# Patient Record
Sex: FEMALE | Race: BLACK OR AFRICAN AMERICAN | NOT HISPANIC OR LATINO | Employment: UNEMPLOYED | URBAN - METROPOLITAN AREA
[De-identification: names, ages, dates, MRNs, and addresses within clinical notes are randomized per-mention and may not be internally consistent; named-entity substitution may affect disease eponyms.]

---

## 2023-03-31 LAB
ERYTHROCYTE DISTRIBUTION WIDTH (RATIO) BY AUTOMATED COUNT: 12.9 % (ref 11.5–14.5)
ERYTHROCYTE MEAN CORPUSCULAR HEMOGLOBIN CONCENTRATION (G/DL) BY AUTOMATED: 31.9 G/DL (ref 32–36)
ERYTHROCYTE MEAN CORPUSCULAR VOLUME (FL) BY AUTOMATED COUNT: 91 FL (ref 80–100)
ERYTHROCYTES (10*6/UL) IN BLOOD BY AUTOMATED COUNT: 3.91 X10E12/L (ref 4–5.2)
GLUCOSE, 1 HR SCREEN, PREG: 97 MG/DL
HEMATOCRIT (%) IN BLOOD BY AUTOMATED COUNT: 35.4 % (ref 36–46)
HEMOGLOBIN (G/DL) IN BLOOD: 11.3 G/DL (ref 12–16)
LEUKOCYTES (10*3/UL) IN BLOOD BY AUTOMATED COUNT: 14.3 X10E9/L (ref 4.4–11.3)
NRBC (PER 100 WBCS) BY AUTOMATED COUNT: 0 /100 WBC (ref 0–0)
PLATELETS (10*3/UL) IN BLOOD AUTOMATED COUNT: 260 X10E9/L (ref 150–450)
REFLEX ADDED, ANEMIA PANEL: ABNORMAL
SYPHILIS TOTAL AB: NONREACTIVE

## 2023-05-18 ENCOUNTER — HOSPITAL ENCOUNTER (OUTPATIENT)
Dept: DATA CONVERSION | Facility: HOSPITAL | Age: 33
End: 2023-05-18
Attending: OBSTETRICS & GYNECOLOGY

## 2023-05-18 DIAGNOSIS — Z3A.33 33 WEEKS GESTATION OF PREGNANCY (HHS-HCC): ICD-10-CM

## 2023-05-18 DIAGNOSIS — O26.893 OTHER SPECIFIED PREGNANCY RELATED CONDITIONS, THIRD TRIMESTER (HHS-HCC): ICD-10-CM

## 2023-05-18 DIAGNOSIS — O99.333 SMOKING (TOBACCO) COMPLICATING PREGNANCY, THIRD TRIMESTER (HHS-HCC): ICD-10-CM

## 2023-05-18 DIAGNOSIS — R10.13 EPIGASTRIC PAIN: ICD-10-CM

## 2023-05-18 DIAGNOSIS — O09.213 SUPERVISION OF PREGNANCY WITH HISTORY OF PRE-TERM LABOR, THIRD TRIMESTER (HHS-HCC): ICD-10-CM

## 2023-05-18 DIAGNOSIS — O98.513 OTHER VIRAL DISEASES COMPLICATING PREGNANCY, THIRD TRIMESTER (HHS-HCC): ICD-10-CM

## 2023-05-18 DIAGNOSIS — F17.210 NICOTINE DEPENDENCE, CIGARETTES, UNCOMPLICATED: ICD-10-CM

## 2023-05-18 DIAGNOSIS — B00.9 HERPESVIRAL INFECTION, UNSPECIFIED: ICD-10-CM

## 2023-05-18 DIAGNOSIS — D64.9 ANEMIA, UNSPECIFIED: ICD-10-CM

## 2023-05-18 DIAGNOSIS — O99.013 ANEMIA COMPLICATING PREGNANCY, THIRD TRIMESTER (HHS-HCC): ICD-10-CM

## 2023-05-18 LAB
ALANINE AMINOTRANSFERASE (SGPT) (U/L) IN SER/PLAS: 16 U/L (ref 7–45)
ALBUMIN (G/DL) IN SER/PLAS: 3.5 G/DL (ref 3.4–5)
ALKALINE PHOSPHATASE (U/L) IN SER/PLAS: 70 U/L (ref 33–110)
ANION GAP IN SER/PLAS: 14 MMOL/L (ref 10–20)
ASPARTATE AMINOTRANSFERASE (SGOT) (U/L) IN SER/PLAS: 21 U/L (ref 9–39)
BILIRUBIN TOTAL (MG/DL) IN SER/PLAS: 0.3 MG/DL (ref 0–1.2)
CALCIUM (MG/DL) IN SER/PLAS: 8.5 MG/DL (ref 8.6–10.6)
CARBON DIOXIDE, TOTAL (MMOL/L) IN SER/PLAS: 22 MMOL/L (ref 21–32)
CHLORIDE (MMOL/L) IN SER/PLAS: 108 MMOL/L (ref 98–107)
CREATININE (MG/DL) IN SER/PLAS: 0.62 MG/DL (ref 0.5–1.05)
ERYTHROCYTE DISTRIBUTION WIDTH (RATIO) BY AUTOMATED COUNT: 13.5 % (ref 11.5–14.5)
ERYTHROCYTE MEAN CORPUSCULAR HEMOGLOBIN CONCENTRATION (G/DL) BY AUTOMATED: 32.1 G/DL (ref 32–36)
ERYTHROCYTE MEAN CORPUSCULAR VOLUME (FL) BY AUTOMATED COUNT: 89 FL (ref 80–100)
ERYTHROCYTES (10*6/UL) IN BLOOD BY AUTOMATED COUNT: 3.69 X10E12/L (ref 4–5.2)
GFR FEMALE: >90 ML/MIN/1.73M2
GLUCOSE (MG/DL) IN SER/PLAS: 97 MG/DL (ref 74–99)
HEMATOCRIT (%) IN BLOOD BY AUTOMATED COUNT: 33 % (ref 36–46)
HEMOGLOBIN (G/DL) IN BLOOD: 10.6 G/DL (ref 12–16)
LEUKOCYTES (10*3/UL) IN BLOOD BY AUTOMATED COUNT: 14 X10E9/L (ref 4.4–11.3)
NRBC (PER 100 WBCS) BY AUTOMATED COUNT: 0 /100 WBC (ref 0–0)
PLATELETS (10*3/UL) IN BLOOD AUTOMATED COUNT: 242 X10E9/L (ref 150–450)
POTASSIUM (MMOL/L) IN SER/PLAS: 3.4 MMOL/L (ref 3.5–5.3)
PROTEIN TOTAL: 5.9 G/DL (ref 6.4–8.2)
SODIUM (MMOL/L) IN SER/PLAS: 141 MMOL/L (ref 136–145)
UREA NITROGEN (MG/DL) IN SER/PLAS: 9 MG/DL (ref 6–23)

## 2023-05-21 LAB — GROUP B STREP SCREEN: NORMAL

## 2023-06-04 ENCOUNTER — HOSPITAL ENCOUNTER (OUTPATIENT)
Dept: DATA CONVERSION | Facility: HOSPITAL | Age: 33
End: 2023-06-04
Attending: OBSTETRICS & GYNECOLOGY

## 2023-06-04 DIAGNOSIS — O09.213 SUPERVISION OF PREGNANCY WITH HISTORY OF PRE-TERM LABOR, THIRD TRIMESTER (HHS-HCC): ICD-10-CM

## 2023-06-04 DIAGNOSIS — O99.333 SMOKING (TOBACCO) COMPLICATING PREGNANCY, THIRD TRIMESTER (HHS-HCC): ICD-10-CM

## 2023-06-04 DIAGNOSIS — B00.9 HERPESVIRAL INFECTION, UNSPECIFIED: ICD-10-CM

## 2023-06-04 DIAGNOSIS — Z79.899 OTHER LONG TERM (CURRENT) DRUG THERAPY: ICD-10-CM

## 2023-06-04 DIAGNOSIS — Z3A.36 36 WEEKS GESTATION OF PREGNANCY (HHS-HCC): ICD-10-CM

## 2023-06-04 DIAGNOSIS — F17.210 NICOTINE DEPENDENCE, CIGARETTES, UNCOMPLICATED: ICD-10-CM

## 2023-06-04 DIAGNOSIS — O98.513 OTHER VIRAL DISEASES COMPLICATING PREGNANCY, THIRD TRIMESTER (HHS-HCC): ICD-10-CM

## 2023-06-22 LAB — GROUP B STREP SCREEN: NORMAL

## 2023-06-25 ENCOUNTER — HOSPITAL ENCOUNTER (OUTPATIENT)
Dept: DATA CONVERSION | Facility: HOSPITAL | Age: 33
End: 2023-06-25
Attending: OBSTETRICS & GYNECOLOGY

## 2023-07-27 LAB
CHLAMYDIA TRACH., AMPLIFIED: NEGATIVE
N. GONORRHEA, AMPLIFIED: NEGATIVE
TRICHOMONAS VAGINALIS: NEGATIVE

## 2023-09-03 PROBLEM — Z34.90 PRENATAL CARE (HHS-HCC): Status: ACTIVE | Noted: 2023-09-03

## 2023-09-03 PROBLEM — A60.04 HERPES SIMPLEX VULVOVAGINITIS: Status: ACTIVE | Noted: 2023-09-03

## 2023-09-03 PROBLEM — R87.610 ATYPICAL SQUAMOUS CELLS OF UNDETERMINED SIGNIFICANCE (ASC-US) ON CERVICAL PAP SMEAR: Status: ACTIVE | Noted: 2023-09-03

## 2023-09-03 RX ORDER — MULTIVIT,IRON,MINERALS/LUTEIN
1 TABLET ORAL DAILY
COMMUNITY
Start: 2022-12-05

## 2023-09-03 RX ORDER — VALACYCLOVIR HYDROCHLORIDE 500 MG/1
500 TABLET, FILM COATED ORAL 2 TIMES DAILY
COMMUNITY
Start: 2023-06-14

## 2023-09-07 VITALS
OXYGEN SATURATION: 95 % | WEIGHT: 194.45 LBS | HEIGHT: 66 IN | BODY MASS INDEX: 31.25 KG/M2 | RESPIRATION RATE: 16 BRPM | DIASTOLIC BLOOD PRESSURE: 65 MMHG | SYSTOLIC BLOOD PRESSURE: 118 MMHG | HEART RATE: 109 BPM | TEMPERATURE: 98.2 F

## 2023-09-07 VITALS — WEIGHT: 191.8 LBS | BODY MASS INDEX: 30.82 KG/M2 | HEIGHT: 66 IN

## 2023-09-30 NOTE — PROGRESS NOTES
Current Stage:   Stage: Triage     Subjective Data:   Antepartum:  Vaginal Bleeding: No   Contractions/Abdominal Pain: Yes   Discharge/Loss of Fluid: No   Fetal Movement: Good   Fevers/Chills: No   Preeclampsia Symptoms: No   Antepartum:    32yo  at 36.0wga by LMP c/w 11wk US presents for ctx. Q6-7min since this AM, but irregular. No VB, LOF. Good FM. No other concerns    Pregnancy n/f  - Hx PTL & PTD at 29wga. LTCS for breech  - Hx  x1, desires TOLAC. MFMU score 92.6%  - Hx shoulder dystocia in P1 with 10lb infant, subsequent 2 vaginal deliveries without shoulder  - FGR, last US  EFW 2178g 7%, AC 6%  - HSV, on valtrex  - Desires BTL, consent signed     ObHx:     at term, shoulder dystocia (10lb)    at term   pLTCS for PTL and breech at 29 weeks    at term  GynHx:  pap ASCUS HPV+ s/p normal colpo  PMHx: as above  SurgHx: CS as above  Meds: PNV  All: NKDA   FHx: reviewed and noncontributory to presenting complaint   SocHx: 1-2 cigarettes/day, denies alcohol use during pregnancy, denies recreational drug use      Objective Information:    Objective Information:      T   P  R  BP   MAP  SpO2   Value  36.6  102  18  130/70   93  97%  Date/Time  14:35  14:43  14:35  14:35   14:35  14:43  Range  (36.6C - 36.6C )  (102 - 104 )  (18 - 18 )  (130 - 130 )/ (70 - 70 )  (93 - 93 )  (97% - 100% )      Pain reported at  14:35: 0 = None      Physical Exam:   Constitutional: alert and oriented, no acute distress   Obstetric: FHT: 130/mod derrick/+accel/-decel  TOCO: quiet  SVE: 2/30/-3   Eyes: EOMI   ENMT: MMM   Head/Neck: NCAT   Respiratory/Thorax: good respiratory effort   Cardiovascular: warm, well perfused   Gastrointestinal: abdomen gravid, soft, nontender   Musculoskeletal: no deformities   Extremities: moving normally, no edema appreciated   Neurological: no gross deficits   Psychological: appropriate mood and affect   Skin: warm, dry      Assessment and Plan:   Assessment:    32yo  at 36.0wga by LMP c/w 11wk US presents for ctx    Ctx  - Exam consistent with prior exam, low concern for PTL  - Abd exam benign, low concern for uterine rupture or other acute process  - Discussed 2hr recheck vs discharge, pt lives close by and desires discharge. Return precautions given    IUP  - Up to date on care, follows with Dr. Mccullough  - NST reactive, reassuring    D/w Dr. Marilu Baer MD  PGY-2, Obstetrics and Gynecology    Attestation:   Note Completion:  I am a:  Resident/Fellow   Attending Attestation I saw and evaluated the patient.  I personally obtained the key and critical portions of the history and physical exam or was physically present for key and  critical portions performed by the resident/fellow. I reviewed the resident/fellow?s documentation and discussed the patient with the resident/fellow.  I agree with the resident/fellow?s medical decision making as documented in the note.     I personally evaluated the patient on 2023         Electronic Signatures:  Marilee Baer (Resident))  (Signed 2023 15:56)   Authored: Current Stage, Subjective Data, Objective Data,  Assessment and Plan, Note Completion  Aristides Michel)  (Signed 2023 19:32)   Authored: Note Completion   Co-Signer: Subjective Data, Objective Data, Assessment and Plan, Note Completion      Last Updated: 2023 19:32 by Aristides Michel)

## 2023-09-30 NOTE — PROGRESS NOTES
Current Stage:   Stage: Triage     Subjective Data:   Antepartum:  Vaginal Bleeding: No   Contractions/Abdominal Pain: No   Discharge/Loss of Fluid: No   Fetal Movement: Good   Fevers/Chills: No   Preeclampsia Symptoms: No   Antepartum:    32 y/o  at 33.4 wga by LMP c/w 11wk US presents with sharp 8/10 abdominal pain in the epigastric region. Patient reports having similar pain in the past but  it states that it has worsened recently. Pain is consistently worse after eating and not affected by position.  Denies fever, constipation or GI symptoms. Endorses regular fetal movement, denies vaginal bleeding or discharge, denies ROM, denies contractions.    Obhx:   2008: vaginal delivery at term, shoulder dystocia (10lb)  2010:  at term  2011: LTCS PTL and breech 29 weeks  2014:  at term    Cervical length screening normal, prenatal labs up to date    Most recent Hgb: 10.6 23    PMHx: HSV  Meds: PNV  FHx: diabetes, hypertension  SocHx: 1-2 cigarettes/day, denies alcohol use during pregnancy, denies recreational drug use  All: NKDA      Objective Information:    Objective Information:      T   P  R  BP   MAP  SpO2   Value  36.3  47  18  127/69   90  81%  Date/Time  2:38  3:22  2:38  2:38   2:38  3:22  Range  (36.3C - 36.8C )  (47 - 110 )  (16 - 18 )  (118 - 127 )/ (65 - 69 )  (90 - 90 )  (81% - 97% )      Pain reported at  2:38: 8 = Severe      Physical Exam:   Constitutional: Well developed, no acute distress   Obstetric: , + accels, no decels, moderate  variability  SVE 2/long/hi  [place] none   Eyes: EOMI, sclera clear   ENMT: MMM, no apparent injury or lesions   Head/Neck: NCAT   Respiratory/Thorax: Normal respiratory effort on  RA   Cardiovascular: regular rate   Gastrointestinal: abdomen gravid, mild tenderness  in epigastric region   Genitourinary: normal external anatomy, no lesions  or rashes   Musculoskeletal: ROM grossly normal   Extremities: No  edema, erythema, cyanosis   Neurological: no focal deficits   Breast: deferred   Lymphatic: deferred   Psychological: Appropriate mood and behavior   Skin: WDWP     Recent Lab Results:    Results:    CBC: 2023 03:20              \     Hgb     /                              \     10.6 L    /  WBC  ----------------  Plt               14.0 H    ----------------    242              /     Hct     \                              /     33.0 L    \            RBC: 3.69 L    MCV: 89               I have reviewed these laboratory results: Complete Blood Count [Drawn 18-May-2023 03:20:00], Comprehensive Metabolic Panel [Drawn 18-May-2023 03:20:00].      I have reviewed these laboratory results:    Comprehensive Metabolic Panel  18-May-2023 03:20:00      Result Value    Glucose, Serum  97    NA  141    K  3.4   L   CL  108   H   Bicarbonate, Serum  22    Anion Gap, Serum  14    BUN  9    CREAT  0.62    GFR Female  >90    Calcium, Serum  8.5   L   ALB  3.5    ALKP  70    T Pro  5.9   L   T Bili  0.3    Alanine Aminotransferase, Serum  16    Aspartate Transaminase, Serum  21         Testing:   NST Interpretation - Baby A:  ·  Baseline    ·  Variability moderate (amplitude range 6 to 25 bpm)   ·  Interpretation Reactive (2 15x15 accels)   ·  Accelerations +   ·  Decelerations -     Assessment and Plan:   Assessment:    34 y/o  at 33.4 wga by LMP c/w 11wk US with epigastric pain, worsens with eating, concerning for GERD    1) Epigastric abdominal pain  - Likely GERD given postprandial exacerbation of symptoms and improvement with famotidine  - Mild anemia on CBC, CMP otherwise unremarkable, except for mildly low K+ at 3.4  - Rx sent for famotidine 40mg daily, also recommend tums OTC    2) Routine Prenatal care  - IUP @ 33.4wga, NST reactive, reassuring  - Routine prenatal follow up with Dr. Mccullough  at 1530  - patient had a prior successful  and would like another one  - hx of HSV, planning for  suppression at 36 weeks  - hx of  labor at 29 weeks, cervical length screening normal  - prenatal labs reviewed, up to date  - recommend PO iron daily for mild anemia at 10.6    Reviewed labor precautions with patient. Discussed need to return to labor and delivery if patient has strong, regular contractions, leakage of fluid, vaginal bleeding, or decreased fetal movement. Patient expresses understanding. Safe and stable for  discharge at this time.    Pt seen and discussed w/ Dr. Michaels and Dr. Yennifer Stewart (MS3)    PGY4 Addendum: saw patient and discussed with student, agree with plan and assessment as edited and documented above.    Ramo De Oliveira MD PGY-4  OBGYN Chief [id number]  Vocera/Brieo                Attestation:   Note Completion:  I am a:  Resident/Fellow   Attending Attestation I saw and evaluated the patient.  I personally obtained the key and critical portions of the history and physical exam or was physically present for key and  critical portions performed by the resident/fellow. I reviewed the resident/fellow?s documentation and discussed the patient with the resident/fellow.  I agree with the resident/fellow?s medical decision making as documented in the note.     I personally evaluated the patient on 18-May-2023         Electronic Signatures:  Trenton Michaels)  (Signed 19-May-2023 10:48)   Authored: Note Completion   Co-Signer: Subjective Data, Objective Data, Assessment and Plan, Note Completion  Ramo De Oliveira (Resident))  (Signed 18-May-2023 08:43)   Authored: Subjective Data, Objective Data,   Testing, Assessment and Plan, Note Completion  Ramesh Stewart (MED STUD)  (Signed 18-May-2023 04:59)   Authored: Current Stage, Subjective Data, Objective Data,  Assessment and Plan      Last Updated: 19-May-2023 10:48 by Trenton Michaels)

## 2023-10-09 ENCOUNTER — OFFICE VISIT (OUTPATIENT)
Dept: OBSTETRICS AND GYNECOLOGY | Facility: CLINIC | Age: 33
End: 2023-10-09
Payer: COMMERCIAL

## 2023-10-09 DIAGNOSIS — Z30.8 ENCOUNTER FOR OTHER CONTRACEPTIVE MANAGEMENT: ICD-10-CM

## 2023-10-09 PROCEDURE — 96372 THER/PROPH/DIAG INJ SC/IM: CPT | Performed by: ADVANCED PRACTICE MIDWIFE

## 2023-10-09 RX ORDER — MEDROXYPROGESTERONE ACETATE 150 MG/ML
150 INJECTION, SUSPENSION INTRAMUSCULAR ONCE
Status: CANCELLED | OUTPATIENT
Start: 2023-10-09 | End: 2023-10-09

## 2023-10-09 RX ORDER — MEDROXYPROGESTERONE ACETATE 150 MG/ML
150 INJECTION, SUSPENSION INTRAMUSCULAR
Status: SHIPPED | OUTPATIENT
Start: 2023-10-09

## 2023-10-09 RX ADMIN — MEDROXYPROGESTERONE ACETATE 150 MG: 150 INJECTION, SUSPENSION INTRAMUSCULAR at 10:55

## 2023-10-09 NOTE — PROGRESS NOTES
Patient here for depo provera injection    Last injection:  7/25/23  Next injection due:  12/25-1/8    Patient doing well on the medication with no complaints  Annual exam is due:  10/2024    ABHI Tarango RN

## 2023-10-30 ENCOUNTER — TELEPHONE (OUTPATIENT)
Dept: OBSTETRICS AND GYNECOLOGY | Facility: CLINIC | Age: 33
End: 2023-10-30

## 2023-11-01 ENCOUNTER — TELEPHONE (OUTPATIENT)
Dept: OBSTETRICS AND GYNECOLOGY | Facility: CLINIC | Age: 33
End: 2023-11-01
Payer: COMMERCIAL

## 2023-11-14 NOTE — TELEPHONE ENCOUNTER
Left another message today, we could change her depo appointment on 12/26 to include colposcopy.

## 2023-12-26 ENCOUNTER — APPOINTMENT (OUTPATIENT)
Dept: OBSTETRICS AND GYNECOLOGY | Facility: CLINIC | Age: 33
End: 2023-12-26
Payer: COMMERCIAL

## 2024-05-17 ENCOUNTER — TELEPHONE (OUTPATIENT)
Dept: OBSTETRICS AND GYNECOLOGY | Facility: CLINIC | Age: 34
End: 2024-05-17

## 2024-05-17 ENCOUNTER — APPOINTMENT (OUTPATIENT)
Dept: OBSTETRICS AND GYNECOLOGY | Facility: CLINIC | Age: 34
End: 2024-05-17
Payer: COMMERCIAL

## 2024-05-17 NOTE — TELEPHONE ENCOUNTER
Called patient to schedule her for a depo injection. Patient scheduled for Monday 5/20/2024. Patient has no questions or concerns

## 2024-05-20 ENCOUNTER — APPOINTMENT (OUTPATIENT)
Dept: OBSTETRICS AND GYNECOLOGY | Facility: CLINIC | Age: 34
End: 2024-05-20
Payer: COMMERCIAL

## 2024-05-24 ENCOUNTER — CLINICAL SUPPORT (OUTPATIENT)
Dept: OBSTETRICS AND GYNECOLOGY | Facility: CLINIC | Age: 34
End: 2024-05-24
Payer: COMMERCIAL

## 2024-05-24 DIAGNOSIS — Z30.42 DEPO-PROVERA CONTRACEPTIVE STATUS: ICD-10-CM

## 2024-05-24 LAB — PREGNANCY TEST URINE, POC: NEGATIVE

## 2024-05-24 PROCEDURE — 96372 THER/PROPH/DIAG INJ SC/IM: CPT | Performed by: ADVANCED PRACTICE MIDWIFE

## 2024-05-24 PROCEDURE — 81025 URINE PREGNANCY TEST: CPT | Performed by: OBSTETRICS & GYNECOLOGY

## 2024-05-24 RX ADMIN — MEDROXYPROGESTERONE ACETATE 150 MG: 150 INJECTION, SUSPENSION INTRAMUSCULAR at 16:00

## 2024-05-24 NOTE — PROGRESS NOTES
Patient here for Depo injection.  UPT: negative in office   Last Annual: 7/25/23  Complaints with being on Depo Provera: None  RN discussed Depo-Provera side effects.  Depo given IM right deltoid region. Depo given from office stock Patient tolerated well.  Patient to RTC between August 9th -23rd  for depo and annual is due  Patient verbalized understanding and all questions were answered.

## 2024-08-09 ENCOUNTER — APPOINTMENT (OUTPATIENT)
Dept: OBSTETRICS AND GYNECOLOGY | Facility: CLINIC | Age: 34
End: 2024-08-09
Payer: COMMERCIAL

## 2024-08-16 ENCOUNTER — APPOINTMENT (OUTPATIENT)
Dept: OBSTETRICS AND GYNECOLOGY | Facility: CLINIC | Age: 34
End: 2024-08-16
Payer: COMMERCIAL

## 2024-09-04 ENCOUNTER — APPOINTMENT (OUTPATIENT)
Dept: OBSTETRICS AND GYNECOLOGY | Facility: CLINIC | Age: 34
End: 2024-09-04
Payer: COMMERCIAL

## 2024-09-04 VITALS
DIASTOLIC BLOOD PRESSURE: 77 MMHG | HEIGHT: 66 IN | BODY MASS INDEX: 30.37 KG/M2 | WEIGHT: 189 LBS | SYSTOLIC BLOOD PRESSURE: 135 MMHG

## 2024-09-04 DIAGNOSIS — Z30.42 DEPO-PROVERA CONTRACEPTIVE STATUS: Primary | ICD-10-CM

## 2024-09-04 LAB — PREGNANCY TEST URINE, POC: NEGATIVE

## 2024-09-04 PROCEDURE — 1036F TOBACCO NON-USER: CPT | Performed by: OBSTETRICS & GYNECOLOGY

## 2024-09-04 PROCEDURE — 3008F BODY MASS INDEX DOCD: CPT | Performed by: OBSTETRICS & GYNECOLOGY

## 2024-09-04 PROCEDURE — 99213 OFFICE O/P EST LOW 20 MIN: CPT | Performed by: OBSTETRICS & GYNECOLOGY

## 2024-09-04 PROCEDURE — 96372 THER/PROPH/DIAG INJ SC/IM: CPT | Performed by: OBSTETRICS & GYNECOLOGY

## 2024-09-04 PROCEDURE — 81025 URINE PREGNANCY TEST: CPT | Performed by: OBSTETRICS & GYNECOLOGY

## 2024-09-04 RX ORDER — MEDROXYPROGESTERONE ACETATE 150 MG/ML
150 INJECTION, SUSPENSION INTRAMUSCULAR ONCE
Status: COMPLETED | OUTPATIENT
Start: 2024-09-04 | End: 2024-09-04

## 2024-09-04 ASSESSMENT — PAIN SCALES - GENERAL: PAINLEVEL: 0-NO PAIN

## 2024-09-04 ASSESSMENT — ENCOUNTER SYMPTOMS
OCCASIONAL FEELINGS OF UNSTEADINESS: 0
DEPRESSION: 0
LOSS OF SENSATION IN FEET: 0

## 2024-09-04 NOTE — PROGRESS NOTES
SUBJECTIVE    33 y.o.  Having periods female presents for   Chief Complaint   Patient presents with    Contraception     Pt here to restart depo injections  Denies concerns      Pt presents for Depo Provera restart.  Her last injection was 2024 and she missed a few appts.  She has been sexually active without other forms of birth control.    OB/GYN History  No LMP recorded (lmp unknown).    Social History     Substance and Sexual Activity   Sexual Activity Yes    Partners: Male, Choose not to disclose    Birth control/protection: Injection       OB History    Para Term  AB Living   2 2 2     1   SAB IAB Ectopic Multiple Live Births           1      # Outcome Date GA Lbr Yonas/2nd Weight Sex Type Anes PTL Lv   2 Term 08 39w0d  4.536 kg F Vag-Spont   ELLYN      Birth Comments: shoulder dystocia   1 Term    3.515 kg           Birth Comments: No shoulder dystocia       Past Medical History  She has a past medical history of Benign intracranial hypertension.    Surgical History  She has a past surgical history that includes Other surgical history (2022) and  section, low transverse (2013).     Social History  She reports that she has quit smoking. She has never used smokeless tobacco. She reports that she does not currently use alcohol. She reports that she does not currently use drugs.    Screenings  Social Determinants of Health     Tobacco Use: Medium Risk (2024)    Patient History     Smoking Tobacco Use: Former     Smokeless Tobacco Use: Never     Passive Exposure: Not on file   Alcohol Use: Alcohol Misuse (2020)    Received from [facility], [facility]    AUDIT-C     Frequency of Alcohol Consumption: 2-3 times a week     Average Number of Drinks: 3 or 4     Frequency of Binge Drinking: Never   Financial Resource Strain: Not on file   Food Insecurity: Not on file   Transportation Needs: Not on file   Physical Activity: Not on file   Stress: Not on  "file   Social Connections: Not on file   Intimate Partner Violence: Not on file   Depression: Not on file   Housing Stability: Not on file   Utilities: Not on file   Digital Equity: Not on file   Health Literacy: Not on file         OBJECTIVE  Vitals:    09/04/24 1511   BP: 135/77   Weight: 85.7 kg (189 lb)   Height: 1.676 m (5' 6\")     Body mass index is 30.51 kg/m².     Chaperone: Present: yes  OBGyn Exam deferred    UPT negative    ASSESSMENT & PLAN  Problem List Items Addressed This Visit    None  Visit Diagnoses       Depo-Provera contraceptive status    -  Primary            Follow up: DepoProvera administered-- we did discuss that there is always a possibility of a very early pregnancy that a UPT would miss given her recent sexual activity, although the likelihood is extremely low. Pt desires to receive DepoProvera, which is a reasonable decision. RTO 12 weeks.    Bethel Hays MD  Obstetrics & Gynecology  09/04/24  "

## 2024-11-20 ENCOUNTER — APPOINTMENT (OUTPATIENT)
Dept: OBSTETRICS AND GYNECOLOGY | Facility: CLINIC | Age: 34
End: 2024-11-20
Payer: COMMERCIAL

## 2024-11-20 VITALS — WEIGHT: 183 LBS | SYSTOLIC BLOOD PRESSURE: 140 MMHG | DIASTOLIC BLOOD PRESSURE: 82 MMHG | BODY MASS INDEX: 29.54 KG/M2

## 2024-11-20 DIAGNOSIS — Z30.42 DEPO-PROVERA CONTRACEPTIVE STATUS: ICD-10-CM

## 2024-11-20 PROCEDURE — 96372 THER/PROPH/DIAG INJ SC/IM: CPT | Performed by: ADVANCED PRACTICE MIDWIFE

## 2024-11-20 ASSESSMENT — ENCOUNTER SYMPTOMS
HEMATOLOGIC/LYMPHATIC NEGATIVE: 0
NEUROLOGICAL NEGATIVE: 0
ENDOCRINE NEGATIVE: 0
GASTROINTESTINAL NEGATIVE: 0
MUSCULOSKELETAL NEGATIVE: 0
CARDIOVASCULAR NEGATIVE: 0
PSYCHIATRIC NEGATIVE: 0
RESPIRATORY NEGATIVE: 0
EYES NEGATIVE: 0
CONSTITUTIONAL NEGATIVE: 0
ALLERGIC/IMMUNOLOGIC NEGATIVE: 0

## 2024-11-20 NOTE — PROGRESS NOTES
Patient identified by name and      Patient received her last injection on 2024     Patient educated on the importance of compliance   Patient given injection in patient tolerated well   Patient encouraged to schedule return visit prior to leaving the office on or between 2025 - 2025      Patient encouraged to call office if she has any future questions or concerns     Patient verbalized understanding.     Patients next dose to be given 2025 - 2025

## 2025-02-03 ENCOUNTER — APPOINTMENT (OUTPATIENT)
Dept: OBSTETRICS AND GYNECOLOGY | Facility: CLINIC | Age: 35
End: 2025-02-03
Payer: COMMERCIAL

## 2025-02-05 ENCOUNTER — APPOINTMENT (OUTPATIENT)
Dept: OBSTETRICS AND GYNECOLOGY | Facility: CLINIC | Age: 35
End: 2025-02-05
Payer: COMMERCIAL

## 2025-02-06 ENCOUNTER — PROCEDURE VISIT (OUTPATIENT)
Dept: OBSTETRICS AND GYNECOLOGY | Facility: CLINIC | Age: 35
End: 2025-02-06
Payer: COMMERCIAL

## 2025-02-06 VITALS
WEIGHT: 176 LBS | BODY MASS INDEX: 27.62 KG/M2 | DIASTOLIC BLOOD PRESSURE: 71 MMHG | SYSTOLIC BLOOD PRESSURE: 135 MMHG | HEIGHT: 67 IN

## 2025-02-06 DIAGNOSIS — Z30.42 SURVEILLANCE FOR DEPO-PROVERA CONTRACEPTION: Primary | ICD-10-CM

## 2025-02-06 RX ADMIN — MEDROXYPROGESTERONE ACETATE 150 MG: 150 INJECTION, SUSPENSION INTRAMUSCULAR at 11:00

## 2025-02-06 NOTE — PROGRESS NOTES
Subjective   Patient ID: Amalia Grace is a 34 y.o. female who presents for Injections (Pt is here for her Depo Provera that was given by the office./No pain/No falls//).   No complaints. Happy with DMPA        Review of Systems   All other systems reviewed and are negative.      Objective   Physical Exam  Vitals reviewed.   Constitutional:       General: She is not in acute distress.     Appearance: She is not ill-appearing.   Pulmonary:      Effort: Pulmonary effort is normal.   Skin:     Coloration: Skin is not pale.   Neurological:      Mental Status: She is alert.         Assessment/Plan   Diagnoses and all orders for this visit:  Surveillance for Depo-Provera contraception    Here for DMPA injection. Happy with method. No complaints. FU in 3 months.       Dani Hopkins MD 02/06/25 3:44 PM

## 2025-04-25 ENCOUNTER — APPOINTMENT (OUTPATIENT)
Dept: OBSTETRICS AND GYNECOLOGY | Facility: CLINIC | Age: 35
End: 2025-04-25
Payer: COMMERCIAL

## 2025-05-05 ENCOUNTER — APPOINTMENT (OUTPATIENT)
Dept: OBSTETRICS AND GYNECOLOGY | Facility: CLINIC | Age: 35
End: 2025-05-05
Payer: COMMERCIAL

## 2025-05-05 VITALS
HEIGHT: 66 IN | DIASTOLIC BLOOD PRESSURE: 76 MMHG | WEIGHT: 185 LBS | BODY MASS INDEX: 29.73 KG/M2 | SYSTOLIC BLOOD PRESSURE: 120 MMHG

## 2025-05-05 DIAGNOSIS — Z30.42 SURVEILLANCE FOR DEPO-PROVERA CONTRACEPTION: ICD-10-CM

## 2025-05-05 PROCEDURE — 96372 THER/PROPH/DIAG INJ SC/IM: CPT | Performed by: OBSTETRICS & GYNECOLOGY

## 2025-05-05 RX ADMIN — MEDROXYPROGESTERONE ACETATE 150 MG: 150 INJECTION, SUSPENSION INTRAMUSCULAR at 08:30

## 2025-07-22 ENCOUNTER — APPOINTMENT (OUTPATIENT)
Dept: OBSTETRICS AND GYNECOLOGY | Facility: CLINIC | Age: 35
End: 2025-07-22
Payer: COMMERCIAL

## 2025-07-24 ENCOUNTER — APPOINTMENT (OUTPATIENT)
Dept: OBSTETRICS AND GYNECOLOGY | Facility: CLINIC | Age: 35
End: 2025-07-24
Payer: COMMERCIAL

## 2025-07-28 ENCOUNTER — APPOINTMENT (OUTPATIENT)
Dept: OBSTETRICS AND GYNECOLOGY | Facility: CLINIC | Age: 35
End: 2025-07-28
Payer: COMMERCIAL

## 2025-07-31 ENCOUNTER — CLINICAL SUPPORT (OUTPATIENT)
Dept: OBSTETRICS AND GYNECOLOGY | Facility: CLINIC | Age: 35
End: 2025-07-31
Payer: COMMERCIAL

## 2025-07-31 ENCOUNTER — OFFICE VISIT (OUTPATIENT)
Dept: DERMATOLOGY | Facility: CLINIC | Age: 35
End: 2025-07-31
Payer: COMMERCIAL

## 2025-07-31 DIAGNOSIS — L70.0 CYSTIC ACNE VULGARIS: Primary | ICD-10-CM

## 2025-07-31 DIAGNOSIS — Z30.019 ENCOUNTER FOR FEMALE BIRTH CONTROL: Primary | ICD-10-CM

## 2025-07-31 PROCEDURE — 99204 OFFICE O/P NEW MOD 45 MIN: CPT | Performed by: STUDENT IN AN ORGANIZED HEALTH CARE EDUCATION/TRAINING PROGRAM

## 2025-07-31 PROCEDURE — 96372 THER/PROPH/DIAG INJ SC/IM: CPT

## 2025-07-31 PROCEDURE — 2500000004 HC RX 250 GENERAL PHARMACY W/ HCPCS (ALT 636 FOR OP/ED): Mod: JZ,SE

## 2025-07-31 RX ORDER — TRETINOIN 0.5 MG/G
CREAM TOPICAL
Qty: 45 G | Refills: 6 | Status: SHIPPED | OUTPATIENT
Start: 2025-07-31

## 2025-07-31 RX ORDER — SPIRONOLACTONE 50 MG/1
TABLET, FILM COATED ORAL
Qty: 30 TABLET | Refills: 6 | Status: SHIPPED | OUTPATIENT
Start: 2025-07-31

## 2025-07-31 RX ORDER — MEDROXYPROGESTERONE ACETATE 150 MG/ML
150 INJECTION, SUSPENSION INTRAMUSCULAR
Status: SHIPPED | OUTPATIENT
Start: 2025-07-31 | End: 2026-07-26

## 2025-07-31 RX ADMIN — MEDROXYPROGESTERONE ACETATE 150 MG: 150 INJECTION, SUSPENSION INTRAMUSCULAR at 13:10

## 2025-07-31 ASSESSMENT — DERMATOLOGY PATIENT ASSESSMENT
ARE YOU AN ORGAN TRANSPLANT RECIPIENT: NO
DO YOU HAVE IRREGULAR MENSTRUAL CYCLES: YES
WHAT TYPE OF BIRTH CONTROL: SHOT
ARE YOU TRYING TO GET PREGNANT: NO
DO YOU USE A TANNING BED: NO
ARE YOU ON BIRTH CONTROL: YES
DO YOU HAVE ANY NEW OR CHANGING LESIONS: NO
HAVE YOU HAD OR DO YOU HAVE A STAPH INFECTION: NO
HAVE YOU HAD OR DO YOU HAVE VASCULAR DISEASE: NO

## 2025-07-31 ASSESSMENT — DERMATOLOGY QUALITY OF LIFE (QOL) ASSESSMENT
ARE THERE EXCLUSIONS OR EXCEPTIONS FOR THE QUALITY OF LIFE ASSESSMENT: NO
RATE HOW BOTHERED YOU ARE BY EFFECTS OF YOUR SKIN PROBLEMS ON YOUR ACTIVITIES (EG, GOING OUT, ACCOMPLISHING WHAT YOU WANT, WORK ACTIVITIES OR YOUR RELATIONSHIPS WITH OTHERS): 0 - NEVER BOTHERED
RATE HOW BOTHERED YOU ARE BY SYMPTOMS OF YOUR SKIN PROBLEM (EG, ITCHING, STINGING BURNING, HURTING OR SKIN IRRITATION): 0 - NEVER BOTHERED
RATE HOW EMOTIONALLY BOTHERED YOU ARE BY YOUR SKIN PROBLEM (FOR EXAMPLE, WORRY, EMBARRASSMENT, FRUSTRATION): 0 - NEVER BOTHERED
DATE THE QUALITY-OF-LIFE ASSESSMENT WAS COMPLETED: 67417
WHAT SINGLE SKIN CONDITION LISTED BELOW IS THE PATIENT ANSWERING THE QUALITY-OF-LIFE ASSESSMENT QUESTIONS ABOUT: ACNE

## 2025-07-31 ASSESSMENT — ITCH NUMERIC RATING SCALE: HOW SEVERE IS YOUR ITCHING?: 0

## 2025-07-31 ASSESSMENT — PATIENT GLOBAL ASSESSMENT (PGA): PATIENT GLOBAL ASSESSMENT: PATIENT GLOBAL ASSESSMENT:  2 - MILD

## 2025-07-31 NOTE — PROGRESS NOTES
Patient presents today for on time Depo. Patient identified times 2.Injected in Left Deltoid. Patient tolerated well. Patient advised to schedule next Depo when leaving the office.

## 2025-07-31 NOTE — PROGRESS NOTES
Daniel Grace is a 34 y.o. female who presents for the following: Acne.   Never had acne but over a month ago its started and it just got worse and worse  No current treatment      Intake Questions  Do you have any new or changing Lesions?: No  Are you an organ transplant recipient?: No  Have you had or do you have a Staph Infection?: No  Have you had or do you have Vacular Disease?: No  Do you use sunscreen?: None  Do you use a tanning bed?: No  Are you trying to get pregnant?: No  Are you on birth control?: Yes  If on birth control, what type?: SHOT  Do you have irregular menstrual cycles?: Yes    Review of Systems:  No other skin or systemic complaints other than what is documented elsewhere in the note.    The following portions of the chart were reviewed this encounter and updated as appropriate:          Skin Cancer History  Biopsy Log Book  No skin cancers from Specimen Tracking.    Additional History      Specialty Problems    None       Objective   Well appearing patient in no apparent distress; mood and affect are within normal limits.    A focused skin examination was performed. All findings within normal limits unless otherwise noted below.    Assessment/Plan   Skin Exam  1. CYSTIC ACNE VULGARIS  Head - Anterior (Face)  Cystic papules and inflammatory papules around chin area and mandible  This Visit  - spironolactone (Aldactone) 50 mg tablet - Take one pill by mouth at bedtime as tolerated  - tretinoin (Retin-A) 0.05 % cream - Apply a small 1/2 pea sized amount to clean dry face at bedtime.  Start off 2x/week and increase as tolerated.  Currently using depo shot  Been on depo for 2 years now     now

## 2025-08-01 ENCOUNTER — TELEPHONE (OUTPATIENT)
Dept: DERMATOLOGY | Facility: CLINIC | Age: 35
End: 2025-08-01
Payer: COMMERCIAL

## 2025-08-05 ENCOUNTER — TELEPHONE (OUTPATIENT)
Dept: DERMATOLOGY | Facility: CLINIC | Age: 35
End: 2025-08-05
Payer: COMMERCIAL

## 2025-08-05 NOTE — TELEPHONE ENCOUNTER
Pt called stating that Dr. Dimas sent her RX to [facility] and they told her the RX needed a prior authorization.     I completed the PA and called patient back to let her know the PA was approved and she should be hearing from her pharmacy when the medicaiton is ready.

## 2025-08-06 ENCOUNTER — APPOINTMENT (OUTPATIENT)
Dept: DERMATOLOGY | Facility: CLINIC | Age: 35
End: 2025-08-06
Payer: COMMERCIAL

## 2025-09-10 ENCOUNTER — APPOINTMENT (OUTPATIENT)
Dept: DERMATOLOGY | Facility: CLINIC | Age: 35
End: 2025-09-10
Payer: COMMERCIAL